# Patient Record
Sex: MALE | Race: WHITE | Employment: FULL TIME | URBAN - METROPOLITAN AREA
[De-identification: names, ages, dates, MRNs, and addresses within clinical notes are randomized per-mention and may not be internally consistent; named-entity substitution may affect disease eponyms.]

---

## 2018-06-12 LAB
ALBUMIN SERPL-MCNC: NORMAL G/DL
ALP BLD-CCNC: NORMAL U/L
ALT SERPL-CCNC: NORMAL U/L
ANION GAP SERPL CALCULATED.3IONS-SCNC: NORMAL MMOL/L
AST SERPL-CCNC: NORMAL U/L
AVERAGE GLUCOSE: NORMAL
BILIRUB SERPL-MCNC: NORMAL MG/DL
BUN BLDV-MCNC: NORMAL MG/DL
CALCIUM SERPL-MCNC: NORMAL MG/DL
CHLORIDE BLD-SCNC: NORMAL MMOL/L
CHOLESTEROL, TOTAL: NORMAL
CHOLESTEROL/HDL RATIO: NORMAL
CO2: NORMAL
CREAT SERPL-MCNC: NORMAL MG/DL
CREATININE, URINE: NORMAL
GFR CALCULATED: NORMAL
GLUCOSE BLD-MCNC: NORMAL MG/DL
HBA1C MFR BLD: 7.3 %
HDLC SERPL-MCNC: NORMAL MG/DL
LDL CHOLESTEROL CALCULATED: NORMAL
MICROALBUMIN/CREAT 24H UR: 0.6 MG/G{CREAT}
MICROALBUMIN/CREAT UR-RTO: NORMAL
POTASSIUM SERPL-SCNC: NORMAL MMOL/L
SODIUM BLD-SCNC: NORMAL MMOL/L
TOTAL PROTEIN: NORMAL
TRIGL SERPL-MCNC: NORMAL MG/DL
VLDLC SERPL CALC-MCNC: NORMAL MG/DL

## 2018-07-26 ENCOUNTER — HOSPITAL ENCOUNTER (OUTPATIENT)
Dept: NON INVASIVE DIAGNOSTICS | Age: 53
Discharge: HOME | End: 2018-07-26
Payer: COMMERCIAL

## 2018-07-26 VITALS — BODY MASS INDEX: 30.1 KG/M2 | HEIGHT: 71 IN | WEIGHT: 215 LBS

## 2018-07-26 LAB
LV EF: 65 %
LVEF MODALITY: NORMAL

## 2018-07-26 PROCEDURE — 93018 CV STRESS TEST I&R ONLY: CPT | Performed by: INTERNAL MEDICINE

## 2018-07-26 PROCEDURE — 93016 CV STRESS TEST SUPVJ ONLY: CPT | Performed by: INTERNAL MEDICINE

## 2018-07-26 PROCEDURE — 93306 TTE W/DOPPLER COMPLETE: CPT

## 2018-07-26 PROCEDURE — 93017 CV STRESS TEST TRACING ONLY: CPT

## 2019-11-05 ENCOUNTER — OFFICE VISIT (OUTPATIENT)
Dept: ORTHOPEDIC SURGERY | Age: 54
End: 2019-11-05
Payer: COMMERCIAL

## 2019-11-05 VITALS
HEIGHT: 71 IN | SYSTOLIC BLOOD PRESSURE: 143 MMHG | BODY MASS INDEX: 27.44 KG/M2 | WEIGHT: 196 LBS | DIASTOLIC BLOOD PRESSURE: 98 MMHG | HEART RATE: 95 BPM

## 2019-11-05 DIAGNOSIS — M79.18 RIGHT BUTTOCK PAIN: Primary | ICD-10-CM

## 2019-11-05 PROCEDURE — 99203 OFFICE O/P NEW LOW 30 MIN: CPT | Performed by: ORTHOPAEDIC SURGERY

## 2019-11-05 RX ORDER — M-VIT,TX,IRON,MINS/CALC/FOLIC 27MG-0.4MG
1 TABLET ORAL DAILY
COMMUNITY

## 2019-11-05 RX ORDER — ASCORBIC ACID 500 MG
500 TABLET ORAL DAILY
COMMUNITY

## 2019-11-05 RX ORDER — SEMAGLUTIDE 1.34 MG/ML
INJECTION, SOLUTION SUBCUTANEOUS
COMMUNITY
Start: 2019-09-03 | End: 2022-10-19

## 2019-11-05 RX ORDER — SERTRALINE HYDROCHLORIDE 100 MG/1
TABLET, FILM COATED ORAL
COMMUNITY
Start: 2019-09-03 | End: 2022-10-19

## 2019-11-05 SDOH — HEALTH STABILITY: MENTAL HEALTH: HOW OFTEN DO YOU HAVE A DRINK CONTAINING ALCOHOL?: 2-3 TIMES A WEEK

## 2022-10-19 ENCOUNTER — OFFICE VISIT (OUTPATIENT)
Dept: PODIATRY | Age: 57
End: 2022-10-19
Payer: COMMERCIAL

## 2022-10-19 VITALS
SYSTOLIC BLOOD PRESSURE: 123 MMHG | WEIGHT: 196 LBS | DIASTOLIC BLOOD PRESSURE: 79 MMHG | TEMPERATURE: 98.2 F | BODY MASS INDEX: 27.34 KG/M2

## 2022-10-19 DIAGNOSIS — M77.32 CALCANEAL SPUR OF BOTH FEET: ICD-10-CM

## 2022-10-19 DIAGNOSIS — M77.32 CALCANEAL SPUR OF LEFT FOOT: ICD-10-CM

## 2022-10-19 DIAGNOSIS — M72.2 PLANTAR FASCIAL FIBROMATOSIS: Primary | ICD-10-CM

## 2022-10-19 DIAGNOSIS — M79.672 INTRACTABLE LEFT HEEL PAIN: ICD-10-CM

## 2022-10-19 DIAGNOSIS — M77.31 CALCANEAL SPUR OF BOTH FEET: ICD-10-CM

## 2022-10-19 PROCEDURE — 99203 OFFICE O/P NEW LOW 30 MIN: CPT | Performed by: PODIATRIST

## 2022-10-19 PROCEDURE — 20550 NJX 1 TENDON SHEATH/LIGAMENT: CPT | Performed by: PODIATRIST

## 2022-10-19 RX ORDER — EMPAGLIFLOZIN 10 MG/1
TABLET, FILM COATED ORAL
COMMUNITY
Start: 2022-08-23

## 2022-10-19 RX ORDER — GLIPIZIDE 10 MG/1
TABLET ORAL
COMMUNITY
Start: 2022-08-23

## 2022-10-19 RX ORDER — BETAMETHASONE SODIUM PHOSPHATE AND BETAMETHASONE ACETATE 3; 3 MG/ML; MG/ML
4 INJECTION, SUSPENSION INTRA-ARTICULAR; INTRALESIONAL; INTRAMUSCULAR; SOFT TISSUE ONCE
Status: COMPLETED | OUTPATIENT
Start: 2022-10-19 | End: 2022-10-19

## 2022-10-19 RX ORDER — BUPIVACAINE HYDROCHLORIDE 5 MG/ML
1 INJECTION, SOLUTION PERINEURAL ONCE
Status: COMPLETED | OUTPATIENT
Start: 2022-10-19 | End: 2022-10-19

## 2022-10-19 RX ORDER — NAPROXEN 500 MG/1
500 TABLET ORAL 2 TIMES DAILY WITH MEALS
Qty: 60 TABLET | Refills: 5 | Status: SHIPPED | OUTPATIENT
Start: 2022-10-19

## 2022-10-19 RX ADMIN — BETAMETHASONE SODIUM PHOSPHATE AND BETAMETHASONE ACETATE 4 MG: 3; 3 INJECTION, SUSPENSION INTRA-ARTICULAR; INTRALESIONAL; INTRAMUSCULAR; SOFT TISSUE at 10:20

## 2022-10-19 RX ADMIN — BUPIVACAINE HYDROCHLORIDE 5 MG: 5 INJECTION, SOLUTION PERINEURAL at 10:20

## 2022-10-19 NOTE — PROGRESS NOTES
[address]  [address] [place] [address]  Dept: [phone]  Dept Fax: [phone]  Loc: [phone]    NEW PATIENT PROGRESS NOTE  Date of patient's visit: 10/19/2022  Patient's Name:  Si Gilford Case YOB: 1965            Patient Care Team:  Maribteh White MD as PCP - General (Family Medicine)        Chief Complaint   Patient presents with    New Patient    Referral - General    Foot Pain     Referred by Viviana Villegas for left heel pain over one year no trauma noted     Diabetes       Foot Pain     Diabetes    HPI:   Si Gilford Case is a 61 y.o. male who presents to the office today complaining of left heel pain. This new patient relates that the left heel has been bothering him for well over a year patient is a avid hiker some days 8 to 10 miles a day. Pain is mostly on the bottom of the heel. Patient has tried over-the-counter inserts and icing with no improvement. S  No Known Allergies    Past Medical History:   Diagnosis Date    Acute pharyngitis     Basal cell carcinoma     upper back     Diabetes mellitus ([address]     Disorder of upper respiratory system     Dysthymia     Hyperlipidemia     Hypertension     Seasonal allergies     Sleep apnea        Prior to Admission medications    Medication Sig Start Date End Date Taking?  Authorizing Provider   JARDIANCE 10 MG tablet  8/23/22  Yes Historical Provider, MD   glipiZIDE (GLUCOTROL) 10 MG tablet  8/23/22  Yes Historical Provider, MD   naproxen (NAPROSYN) 500 MG tablet Take 1 tablet by mouth 2 times daily (with meals) 10/19/22  Yes Prosper Robertson DPM   metFORMIN (GLUCOPHAGE) 1000 MG tablet  10/19/19  Yes Historical Provider, MD   Multiple Vitamins-Minerals (THERAPEUTIC MULTIVITAMIN-MINERALS) tablet Take 1 tablet by mouth daily   Yes Historical Provider, MD   vitamin C (ASCORBIC ACID) 500 MG tablet Take 500 mg by mouth daily   Yes Historical Provider, MD       Past Surgical History:   Procedure Laterality Date    HERNIA REPAIR      VASECTOMY         Family History   Problem Relation Age of Onset    Thyroid Disease Mother     Diabetes Mother         Non Insulin Dependent     High Cholesterol Mother     Atrial Fibrillation Mother     High Cholesterol Father     Thyroid Disease Father        Social History     Tobacco Use    Smoking status: Never    Smokeless tobacco: Never   Substance Use Topics    Alcohol use: Yes     Comment: \"couple glasses of wine a week\"       Review of Systems    Review of Systems:   History obtained from chart review and the patient  General ROS: negative for - chills, fatigue, fever, night sweats or weight gain  Constitutional: Negative for chills, diaphoresis, fatigue, fever and unexpected weight change. Musculoskeletal: Positive for . Neurological ROS: negative for - behavioral changes, confusion, headaches or seizures. Negative for weakness and numbness. Dermatological ROS: negative for - mole changes, rash  Cardiovascular: Negative for leg swelling. Gastrointestinal: Negative for constipation, diarrhea, nausea and vomiting. Lower Extremity Physical Examination:   Vitals:   Vitals:    10/19/22 0942   BP: 123/79   Temp: 98.2 °F (36.8 °C)        Foot Exam    General  General Appearance: appears stated age and healthy   Orientation: alert and oriented to person, place, and time       Right Foot/Ankle     Inspection and Palpation  Tenderness: calcaneus tenderness, bony tenderness and plantar fascia   Swelling: plantar fascia         Ortho Exam    General: AAO x 3 in NAD. Dermatologic Exam:  Skin lesion/ulceration Absent . Skin No rashes or nodules noted. .   Musculoskeletal:     1st MPJ ROM within normal limits, Bilateral.    Ankle ROM within normal limits,Bilateral.   HEEL PAIN PAIN WITH PALPATION TO THE INFERIOR ASPECT OF THE LEFT HEEL PAIN WITH PALPATION AT THE INSERTION OF THE PLANTAR FASCIAL MUSCLE CALCANEUS     TARSAL TUNNEL PAIN negative  Vascular:     Radiographs:  3 views x-rays show an inferior calcaneal spur foot/ankle:     Asessment: Patient is a 61 y.o. male with:   Kezia Colby was seen today for new patient, referral - general, foot pain and diabetes. Diagnoses and all orders for this visit:    Plantar fascial fibromatosis    Intractable left heel pain  -     XR FOOT LEFT (MIN 3 VIEWS); Future    Calcaneal spur of left foot  -     DME Order for Orthosis as OP    Calcaneal spur of both feet    Other orders  -     naproxen (NAPROSYN) 500 MG tablet; Take 1 tablet by mouth 2 times daily (with meals)  -     betamethasone acetate-betamethasone sodium phosphate (CELESTONE) injection 4 mg  -     bupivacaine (MARCAINE) 0.5 % injection 5 mg       Plan: Patient examined and evaluated. Today an injection into the left heel night splint icing stretching naproxen twice a day orthotics custom made prescription was given current condition and treatment options discussed in detail. Discussed conservative and surgical options with the patient. Treatment options today consisted of verbal and written instructions given to patient. Contact office with any questions/problems/concerns. The patient was dispensed a night splint. It is medically necessary and within the standard of care for the patient's diagnosis. Its purpose is to stretch the gastrocnemius complex as well as the plantar fascial ligament. This in turn allows for a decrease in the amount of equinus deformity present. It was fitted and adjusted by myself. The patient was instructed on its proper application and use. The patient was also instructed to watch for areas of rubbing, irritation, blister formation, or any other signs of abnormal pressure. If this occurs, the patient is to contact the office immediately. The ABN was reviewed and signed by the patient prior to leaving this appointment.    Potential risks, complications, alternative treatments, and procedure prognosis were explained to the patient. Verbal informed consent was obtained from the patient. Chlora prep preparation was performed over plantar fascia. 1 mL of 0.5% Marcaine plain and 1cc celestone Soluspan   injected in standard medial approach plantar fascia. Patient tolerated steroid injection well. Band-Aid applied. left foot   The patient was educated on a possible steroid flare and the use of ice with frozen water bottle 10 minutes each night discussed. Continue passive and active range of motion stretches and strengthening bilateral plantar fascia and Achilles tendons. RTC in 4week(s).     10/19/2022    Electronically signed by Socorro Clifford DPM on 10/19/2022 at 11:04 AM  10/19/2022

## 2022-11-14 ENCOUNTER — OFFICE VISIT (OUTPATIENT)
Dept: PODIATRY | Age: 57
End: 2022-11-14
Payer: COMMERCIAL

## 2022-11-14 VITALS
DIASTOLIC BLOOD PRESSURE: 79 MMHG | WEIGHT: 196 LBS | SYSTOLIC BLOOD PRESSURE: 126 MMHG | TEMPERATURE: 97.9 F | BODY MASS INDEX: 27.34 KG/M2

## 2022-11-14 DIAGNOSIS — M77.32 CALCANEAL SPUR OF BOTH FEET: ICD-10-CM

## 2022-11-14 DIAGNOSIS — M77.31 CALCANEAL SPUR OF BOTH FEET: ICD-10-CM

## 2022-11-14 DIAGNOSIS — M79.672 INTRACTABLE LEFT HEEL PAIN: ICD-10-CM

## 2022-11-14 DIAGNOSIS — M72.2 PLANTAR FASCIAL FIBROMATOSIS: ICD-10-CM

## 2022-11-14 DIAGNOSIS — M77.32 CALCANEAL SPUR OF LEFT FOOT: Primary | ICD-10-CM

## 2022-11-14 PROCEDURE — 99212 OFFICE O/P EST SF 10 MIN: CPT | Performed by: PODIATRIST

## 2022-11-14 NOTE — PROGRESS NOTES
[address] [address]  Dept: [phone]  Dept Fax: [phone]  Loc: [phone]     PATIENT PROGRESS NOTE  Date of patient's visit: 11/14/2022  Patient's Name:  Justin Vigil Case YOB: 1965            Patient Care Team:  Nikki Gaytan MD as PCP - General (Family Medicine)        Chief Complaint   Patient presents with    Foot Pain     Left heel PF had one inj, ns, nsaids icing   Does not take NSAID or wear Night splint everyday   Got powersteps   Everything has helped the pain     Diabetes       Foot Pain   This is a recurrent problem. Diabetes    HPI:   Justin Vigil Case is a 61 y.o. male who presents to the office today complaining of left heel pain. pt doing better   No Known Allergies    Past Medical History:   Diagnosis Date    Acute pharyngitis     Basal cell carcinoma     upper back     Diabetes mellitus ([address]     Disorder of upper respiratory system     Dysthymia     Hyperlipidemia     Hypertension     Seasonal allergies     Sleep apnea        Prior to Admission medications    Medication Sig Start Date End Date Taking?  Authorizing Provider   JARDIANCE 10 MG tablet  8/23/22  Yes Historical Provider, MD   glipiZIDE (GLUCOTROL) 10 MG tablet  8/23/22  Yes Historical Provider, MD   naproxen (NAPROSYN) 500 MG tablet Take 1 tablet by mouth 2 times daily (with meals) 10/19/22  Yes Krishan Zavala DPM   metFORMIN (GLUCOPHAGE) 1000 MG tablet  10/19/19  Yes Historical Provider, MD   Multiple Vitamins-Minerals (THERAPEUTIC MULTIVITAMIN-MINERALS) tablet Take 1 tablet by mouth daily   Yes Historical Provider, MD   vitamin C (ASCORBIC ACID) 500 MG tablet Take 500 mg by mouth daily   Yes Historical Provider, MD       Past Surgical History:   Procedure Laterality Date    HERNIA REPAIR      VASECTOMY         Family History   Problem Relation Age of Onset    Thyroid Disease Mother     Diabetes Mother         Non Insulin Dependent     High Cholesterol Mother     Atrial Fibrillation Mother     High Cholesterol Father     Thyroid Disease Father        Social History     Tobacco Use    Smoking status: Never    Smokeless tobacco: Never   Substance Use Topics    Alcohol use: Yes     Comment: \"couple glasses of wine a week\"       Review of Systems    Review of Systems:   History obtained from chart review and the patient  General ROS: negative for - chills, fatigue, fever, night sweats or weight gain  Constitutional: Negative for chills, diaphoresis, fatigue, fever and unexpected weight change. Musculoskeletal: Positive for . Neurological ROS: negative for - behavioral changes, confusion, headaches or seizures. Negative for weakness and numbness. Dermatological ROS: negative for - mole changes, rash  Cardiovascular: Negative for leg swelling. Gastrointestinal: Negative for constipation, diarrhea, nausea and vomiting. Lower Extremity Physical Examination:   Vitals:   Vitals:    11/14/22 1545   BP: 126/79   Temp: 97.9 °F (36.6 °C)        Foot Exam    General  General Appearance: appears stated age and healthy   Orientation: alert and oriented to person, place, and time       Right Foot/Ankle     Inspection and Palpation  Tenderness: calcaneus tenderness, bony tenderness and plantar fascia   Swelling: plantar fascia         Ortho Exam    General: AAO x 3 in NAD. Dermatologic Exam:  Skin lesion/ulceration Absent . Skin No rashes or nodules noted. .   Musculoskeletal:     1st MPJ ROM within normal limits, Bilateral.    Ankle ROM within normal limits,Bilateral.   HEEL PAIN PAIN WITH PALPATION TO THE INFERIOR ASPECT OF THE LEFT HEEL PAIN WITH PALPATION AT THE INSERTION OF THE PLANTAR FASCIAL MUSCLE CALCANEUS     TARSAL TUNNEL PAIN negative  Vascular:     Radiographs:  3 views x-rays show an inferior calcaneal spur foot/ankle:     Asessment: Patient is a 61 y.o. male with:   Sunday Dimmer was seen today for foot pain and diabetes.     Diagnoses and all orders for this visit:    Calcaneal spur of left foot    Intractable left heel pain    Plantar fascial fibromatosis    Calcaneal spur of both feet       Plan: pt to continue ice stretching r/a as needed    P11/14/2022    Electronically signed by Socorro Clifford DPM on 11/14/2022 at 3:56 PM  11/14/2022